# Patient Record
Sex: FEMALE | Race: WHITE | Employment: OTHER | URBAN - METROPOLITAN AREA
[De-identification: names, ages, dates, MRNs, and addresses within clinical notes are randomized per-mention and may not be internally consistent; named-entity substitution may affect disease eponyms.]

---

## 2024-02-22 NOTE — DISCHARGE INSTRUCTIONS
You may need to have a catheter for a short time.  You may need a catheter after you are sick or have had surgery.  Sometimes a catheter is used for a long time.     What Will the Results Be:  Your urine will drain and you will prevent infection.            What Care is Needed at Home?   Ask your doctor what you need to do when you go home.  Make sure you ask questions if you do not understand what the doctor says.  This way you will know what you need to do.    Your doctor may order a home health nurse to come to your house to help you learn to care for your catheter.    Prevent infections:   Wash your hands before and after handling your catheter.   If you switch between a leg bag and an overnight drainage bag, be sure you clean the connection between the catheter and the bag before you switch bags.  Ask your doctor what to use to clean the connection.   Place a cap on the drainage bag you are not using and store in a clean towel.   Rinse the empty drainage bag not in use with 1 cup vinegar mixed with 1 cup water.    Care for the tube:   Wash the skin around the catheter with soap and water each day.  Pat the skin dry.   Do not put anything on the tube.   Keep the tube secure.  Do not let the tube pull or catch when you are moving around during the day.   Do not let the tube kink or loop.    Care for the Drainage Bag:   Keep your urine bag below your bladder.   Drain the bag often to help keep you from getting an infection.   Wear cotton underwear.    What Follow-Up Care Is Needed?  Your doctor may ask you to make visits to the office to check on your progress.  Be sure to keep these visits.                                      9      What Lifestyle Changes are Needed?   Drink 6-8 glasses of water every day.   Take showers rather than soaking in a bath.                          Will Physical Activity Be Limited?   Talk to your doctor about what you can and cannot do when the catheter is in place.      What  amount catheterized at the 24 hour interval is less than 150 ml on 1 occasion, self-catheterization may be stopped.      SPECIAL INSTRUCTIONS      Call Dr. Genao's office at [phone] on a weekly basis to report to Ladan on your progress.    Call Dr. Genao with the following problems:   Symptoms of a urinary infection (pain, burning, urgency, frequency, or blood).      Fever greater than 100.4 F on 2 occasions 4 hours apart.    The inability to pass urine through the catheter.                 11         Suprapubic Catheter Instructions       After your operation, you may experience swelling around the bladder and urethra.  Swelling may limit your ability to pass urine naturally through your urethra.  The suprapubic catheter (SPC) placed into your pubic hair area allows you to pass urine until swelling subsides. The SPC prevents discomfort from bladder over distention.  The SPC site may be cleaned with soap and water.  The SPC tucks discreetly into your underpants.      By following the simple routine below, you will be able to use the SPC to empty your bladder until your natural ability to pass urine gradually returns.  You will find that as your ability to pass urine naturally improves, the need to use your SPC will be less.  On average, a woman should expect to have the SPC from 2 to 4 weeks.      Attempt to pass urine naturally as often as you like.      At first, do not be surprised if you cannot pass urine in very small amounts.        Try to void naturally immediately before each unplugging of the SPC.   3. During the day, unplug your SPC every 3 - 4 hours to ensure bladder emptying.      So you can sleep well at night, connect your SPC to the night bag.      Measure the residual amount drained out of the SPC at the end of each interval.       Use the measuring hat. If your SPC amount is 400 cc or more, unplug more often (every 2 -3 hours).      Start out with an unplugging interval of every 3 - 4 hours if

## 2024-02-22 NOTE — H&P
[penicillins] Hives 04/20/2015       MEDICATIONS:  Current Facility-Administered Medications   Medication Dose Route Frequency Provider Last Rate Last Admin    lidocaine PF 1 % injection 1 mL  1 mL IntraDERmal Once PRN Saji Rdz MD        lactated ringers IV soln infusion   IntraVENous Continuous Saji Rdz MD        sodium chloride flush 0.9 % injection 5-40 mL  5-40 mL IntraVENous 2 times per day Saji Rdz MD        sodium chloride flush 0.9 % injection 5-40 mL  5-40 mL IntraVENous PRN Saji Rdz MD        0.9 % sodium chloride infusion   IntraVENous PRN Saji Rdz MD        phenazopyridine (PYRIDIUM) tablet 200 mg  200 mg Oral Once Tish Woodward DO        gentamicin (GARAMYCIN) 95.2 mg in sodium chloride 0.9 % 100 mL IVPB  1.5 mg/kg IntraVENous Once Tish Woodward DO           FAMILY HISTORY:  family history is not on file.    SOCIAL HISTORY:   reports that she has never smoked. She has never used smokeless tobacco. She reports current alcohol use of about 6.0 standard drinks of alcohol per week. She reports that she does not use drugs.    VITALS:  Vitals:    02/26/24 1350 03/01/24 1138   BP:  139/84   Pulse:  62   Resp:  15   Temp:  96.8 °F (36 °C)   TempSrc:  Infrared   SpO2:  97%   Weight: 63.5 kg (140 lb) 62.6 kg (138 lb)   Height: 1.727 m (5' 8\") 1.727 m (5' 8\")                                                                                                                               PHYSICAL EXAM:     Unchanged from Prior H&P  General appearance: Well-developed, well-nourished, in no acute distress. Pleasant, alert and oriented to person place and time  Head/face: Normocephalic atraumatic, normal facial strength, no sinus tenderness, and no scars  Eyes: Extraocular movement intact and pupils equal round and reactive to light and accommodation  Neck/thyroid: Neck supple, thyroid nontender without masses, trachea midline  Lymph nodes: No axillary, anterior and  surgical, conservative as well as definitive. She has elected to proceed with the procedure as stated above.     The patient was counseled on the procedure. Risks and complications were reviewed in detail. The patients orders, labs, consents have been completed. The history and physical as well as all supporting surgical documentation will be forwarded to the pre-operative holding area.     The patient is aware that this procedure may not alleviate her symptoms. That there may be a necessity for a second surgery and that there may be an incomplete removal of abnormal tissue.    Tish Woodward DO  UroGyn Resident   [facility]  3/1/2024, 11:12 AM

## 2024-02-26 RX ORDER — ESTRADIOL 0.1 MG/G
2 CREAM VAGINAL
COMMUNITY

## 2024-02-26 RX ORDER — ESOMEPRAZOLE MAGNESIUM 20 MG/1
20 GRANULE, DELAYED RELEASE ORAL DAILY
COMMUNITY

## 2024-02-26 NOTE — PROGRESS NOTES
DAY OF SURGERY/PROCEDURE  GUIDELINES    As a patient at the [facility], you can expect quality medical and nursing care that is centered on your individual needs. It is our goal to make your surgical experience as comfortable and excellent as possible.  ________________________________________________________________________    The following instructions are general guidelines, if any information on this sheet is different from what your doctor has instructed you to do, please follow your doctor's instructions.    Please arrive on 3/1 @ 11:00 am      Enter through entrance C. Check in at registration     Upon arrival you will be taken to the pre-operative area to get ready for surgery, your family will stay in the waiting room and visit with you once you are ready for surgery. Due to special limitations please limit visitation to 1-2 members of your family at a time. When it is time for surgery your family will return to the waiting room.    Nothing to eat, drink, smoke, suck or chew after midnight (no water, gum, mints, cigarettes, cigars, pipes, snuff, chewing tobacco, etc.) or your surgery may be canceled.     Take a shower or bath on the morning of your surgery/procedure (Hibiclens if directed) Do not apply any lotions.    Brush your teeth, but do not swallow any water    IN CASE OF ILLNESS - If you have a cold or flu symptoms (high fever, runny nose, sore throat, cough, etc.) rash, nausea, vomiting, loose stools, and/or recent contact with someone who has a contagious disease (chick pox, measles, etc.) please call your doctor before coming to the surgery center    Take a small sip of water with heart, blood pressure, and/or seizure medication the morning of surgery. Metoprolol, levothyroxine    DO NOT take anticoagulants (blood thinners, aspirin or aspirin-containing products) as instructed by your physician.    Leave all jewelry at home and wear loose, comfortable clothing that is easy to

## 2024-02-29 ENCOUNTER — ANESTHESIA EVENT (OUTPATIENT)
Dept: OPERATING ROOM | Age: 84
End: 2024-02-29
Payer: MEDICARE

## 2024-03-01 ENCOUNTER — ANESTHESIA (OUTPATIENT)
Dept: OPERATING ROOM | Age: 84
End: 2024-03-01
Payer: MEDICARE

## 2024-03-01 ENCOUNTER — HOSPITAL ENCOUNTER (INPATIENT)
Age: 84
LOS: 1 days | Discharge: HOME | DRG: 331 | End: 2024-03-02
Attending: OBSTETRICS & GYNECOLOGY | Admitting: OBSTETRICS & GYNECOLOGY
Payer: MEDICARE

## 2024-03-01 DIAGNOSIS — K64.9 HEMORRHOIDS, UNSPECIFIED HEMORRHOID TYPE: ICD-10-CM

## 2024-03-01 DIAGNOSIS — G89.18 POST-OP PAIN: Primary | ICD-10-CM

## 2024-03-01 DIAGNOSIS — N81.10 VAGINAL PROLAPSE: ICD-10-CM

## 2024-03-01 DIAGNOSIS — K62.3 RECTAL PROLAPSE: ICD-10-CM

## 2024-03-01 PROBLEM — Z98.890 POST-OPERATIVE STATE: Status: ACTIVE | Noted: 2024-03-01

## 2024-03-01 LAB
ALBUMIN SERPL-MCNC: 4 G/DL (ref 3.5–5.2)
ANION GAP SERPL CALCULATED.3IONS-SCNC: 9 MMOL/L (ref 9–17)
BUN SERPL-MCNC: 17 MG/DL (ref 8–23)
CALCIUM SERPL-MCNC: 9.2 MG/DL (ref 8.6–10.4)
CHLORIDE SERPL-SCNC: 104 MMOL/L (ref 98–107)
CO2 SERPL-SCNC: 27 MMOL/L (ref 20–31)
CREAT SERPL-MCNC: 0.9 MG/DL (ref 0.5–0.9)
GFR SERPL CREATININE-BSD FRML MDRD: >60 ML/MIN/1.73M2
GLUCOSE SERPL-MCNC: 142 MG/DL (ref 70–99)
PHOSPHATE SERPL-MCNC: 4.1 MG/DL (ref 2.6–4.5)
POTASSIUM SERPL-SCNC: 3.8 MMOL/L (ref 3.7–5.3)
SODIUM SERPL-SCNC: 140 MMOL/L (ref 135–144)

## 2024-03-01 PROCEDURE — 6360000002 HC RX W HCPCS

## 2024-03-01 PROCEDURE — 6360000002 HC RX W HCPCS: Performed by: STUDENT IN AN ORGANIZED HEALTH CARE EDUCATION/TRAINING PROGRAM

## 2024-03-01 PROCEDURE — 2580000003 HC RX 258: Performed by: ANESTHESIOLOGY

## 2024-03-01 PROCEDURE — 6370000000 HC RX 637 (ALT 250 FOR IP)

## 2024-03-01 PROCEDURE — 0DSP4ZZ REPOSITION RECTUM, PERCUTANEOUS ENDOSCOPIC APPROACH: ICD-10-PCS | Performed by: SURGERY

## 2024-03-01 PROCEDURE — C9290 INJ, BUPIVACAINE LIPOSOME: HCPCS | Performed by: SURGERY

## 2024-03-01 PROCEDURE — 80069 RENAL FUNCTION PANEL: CPT

## 2024-03-01 PROCEDURE — 0USG4ZZ REPOSITION VAGINA, PERCUTANEOUS ENDOSCOPIC APPROACH: ICD-10-PCS | Performed by: SURGERY

## 2024-03-01 PROCEDURE — 88304 TISSUE EXAM BY PATHOLOGIST: CPT

## 2024-03-01 PROCEDURE — 06BY0ZC EXCISION OF HEMORRHOIDAL PLEXUS, OPEN APPROACH: ICD-10-PCS | Performed by: SURGERY

## 2024-03-01 PROCEDURE — 6370000000 HC RX 637 (ALT 250 FOR IP): Performed by: SURGERY

## 2024-03-01 PROCEDURE — 6360000002 HC RX W HCPCS: Performed by: SURGERY

## 2024-03-01 PROCEDURE — 6360000002 HC RX W HCPCS: Performed by: REGISTERED NURSE

## 2024-03-01 PROCEDURE — 3600000009 HC SURGERY ROBOT BASE: Performed by: OBSTETRICS & GYNECOLOGY

## 2024-03-01 PROCEDURE — 2580000003 HC RX 258: Performed by: SURGERY

## 2024-03-01 PROCEDURE — 87086 URINE CULTURE/COLONY COUNT: CPT

## 2024-03-01 PROCEDURE — 0TJB8ZZ INSPECTION OF BLADDER, VIA NATURAL OR ARTIFICIAL OPENING ENDOSCOPIC: ICD-10-PCS | Performed by: OBSTETRICS & GYNECOLOGY

## 2024-03-01 PROCEDURE — 2709999900 HC NON-CHARGEABLE SUPPLY: Performed by: OBSTETRICS & GYNECOLOGY

## 2024-03-01 PROCEDURE — 3700000000 HC ANESTHESIA ATTENDED CARE: Performed by: OBSTETRICS & GYNECOLOGY

## 2024-03-01 PROCEDURE — 6370000000 HC RX 637 (ALT 250 FOR IP): Performed by: STUDENT IN AN ORGANIZED HEALTH CARE EDUCATION/TRAINING PROGRAM

## 2024-03-01 PROCEDURE — 7100000000 HC PACU RECOVERY - FIRST 15 MIN: Performed by: OBSTETRICS & GYNECOLOGY

## 2024-03-01 PROCEDURE — 8E0W4CZ ROBOTIC ASSISTED PROCEDURE OF TRUNK REGION, PERCUTANEOUS ENDOSCOPIC APPROACH: ICD-10-PCS | Performed by: OBSTETRICS & GYNECOLOGY

## 2024-03-01 PROCEDURE — 2580000003 HC RX 258: Performed by: STUDENT IN AN ORGANIZED HEALTH CARE EDUCATION/TRAINING PROGRAM

## 2024-03-01 PROCEDURE — 36415 COLL VENOUS BLD VENIPUNCTURE: CPT

## 2024-03-01 PROCEDURE — C1758 CATHETER, URETERAL: HCPCS | Performed by: OBSTETRICS & GYNECOLOGY

## 2024-03-01 PROCEDURE — 3700000001 HC ADD 15 MINUTES (ANESTHESIA): Performed by: OBSTETRICS & GYNECOLOGY

## 2024-03-01 PROCEDURE — 2500000003 HC RX 250 WO HCPCS: Performed by: REGISTERED NURSE

## 2024-03-01 PROCEDURE — 7100000001 HC PACU RECOVERY - ADDTL 15 MIN: Performed by: OBSTETRICS & GYNECOLOGY

## 2024-03-01 PROCEDURE — 3600000019 HC SURGERY ROBOT ADDTL 15MIN: Performed by: OBSTETRICS & GYNECOLOGY

## 2024-03-01 PROCEDURE — S2900 ROBOTIC SURGICAL SYSTEM: HCPCS | Performed by: OBSTETRICS & GYNECOLOGY

## 2024-03-01 PROCEDURE — 0UWH4JZ REVISION OF SYNTHETIC SUBSTITUTE IN VAGINA AND CUL-DE-SAC, PERCUTANEOUS ENDOSCOPIC APPROACH: ICD-10-PCS | Performed by: OBSTETRICS & GYNECOLOGY

## 2024-03-01 PROCEDURE — 1200000000 HC SEMI PRIVATE

## 2024-03-01 PROCEDURE — 2720000010 HC SURG SUPPLY STERILE: Performed by: OBSTETRICS & GYNECOLOGY

## 2024-03-01 RX ORDER — CLINDAMYCIN PHOSPHATE 300 MG/50ML
300 INJECTION, SOLUTION INTRAVENOUS EVERY 8 HOURS
Status: DISCONTINUED | OUTPATIENT
Start: 2024-03-01 | End: 2024-03-01

## 2024-03-01 RX ORDER — SODIUM CHLORIDE 0.9 % (FLUSH) 0.9 %
5-40 SYRINGE (ML) INJECTION EVERY 12 HOURS SCHEDULED
Status: DISCONTINUED | OUTPATIENT
Start: 2024-03-01 | End: 2024-03-01

## 2024-03-01 RX ORDER — ULTRASOUND COUPLING MEDIUM
GEL (GRAM) TOPICAL PRN
Status: DISCONTINUED | OUTPATIENT
Start: 2024-03-01 | End: 2024-03-01 | Stop reason: ALTCHOICE

## 2024-03-01 RX ORDER — LIDOCAINE HYDROCHLORIDE 10 MG/ML
1 INJECTION, SOLUTION EPIDURAL; INFILTRATION; INTRACAUDAL; PERINEURAL
Status: DISCONTINUED | OUTPATIENT
Start: 2024-03-01 | End: 2024-03-01

## 2024-03-01 RX ORDER — CLINDAMYCIN PHOSPHATE 900 MG/50ML
300 INJECTION, SOLUTION INTRAVENOUS EVERY 8 HOURS
Status: DISCONTINUED | OUTPATIENT
Start: 2024-03-01 | End: 2024-03-02

## 2024-03-01 RX ORDER — SODIUM CHLORIDE 9 MG/ML
INJECTION, SOLUTION INTRAVENOUS PRN
Status: DISCONTINUED | OUTPATIENT
Start: 2024-03-01 | End: 2024-03-02 | Stop reason: HOSPADM

## 2024-03-01 RX ORDER — HYDRALAZINE HYDROCHLORIDE 20 MG/ML
10 INJECTION INTRAMUSCULAR; INTRAVENOUS
Status: DISCONTINUED | OUTPATIENT
Start: 2024-03-01 | End: 2024-03-01

## 2024-03-01 RX ORDER — ONDANSETRON 2 MG/ML
INJECTION INTRAMUSCULAR; INTRAVENOUS PRN
Status: DISCONTINUED | OUTPATIENT
Start: 2024-03-01 | End: 2024-03-01

## 2024-03-01 RX ORDER — OXYCODONE HYDROCHLORIDE 5 MG/1
5 TABLET ORAL EVERY 4 HOURS PRN
Status: DISCONTINUED | OUTPATIENT
Start: 2024-03-01 | End: 2024-03-02 | Stop reason: HOSPADM

## 2024-03-01 RX ORDER — SODIUM CHLORIDE, SODIUM LACTATE, POTASSIUM CHLORIDE, CALCIUM CHLORIDE 600; 310; 30; 20 MG/100ML; MG/100ML; MG/100ML; MG/100ML
INJECTION, SOLUTION INTRAVENOUS CONTINUOUS
Status: DISCONTINUED | OUTPATIENT
Start: 2024-03-01 | End: 2024-03-02

## 2024-03-01 RX ORDER — SODIUM CHLORIDE 0.9 % (FLUSH) 0.9 %
5-40 SYRINGE (ML) INJECTION EVERY 12 HOURS SCHEDULED
Status: DISCONTINUED | OUTPATIENT
Start: 2024-03-01 | End: 2024-03-02 | Stop reason: HOSPADM

## 2024-03-01 RX ORDER — FENTANYL CITRATE 50 UG/ML
INJECTION, SOLUTION INTRAMUSCULAR; INTRAVENOUS PRN
Status: DISCONTINUED | OUTPATIENT
Start: 2024-03-01 | End: 2024-03-01 | Stop reason: SDUPTHER

## 2024-03-01 RX ORDER — CIPROFLOXACIN 250 MG/1
250 TABLET, FILM COATED ORAL 2 TIMES DAILY
Qty: 14 TABLET | Refills: 0 | Status: SHIPPED | OUTPATIENT
Start: 2024-03-01 | End: 2024-03-08

## 2024-03-01 RX ORDER — LIDOCAINE 40 MG/G
CREAM TOPICAL PRN
Status: DISCONTINUED | OUTPATIENT
Start: 2024-03-01 | End: 2024-03-02 | Stop reason: HOSPADM

## 2024-03-01 RX ORDER — SODIUM CHLORIDE 0.9 % (FLUSH) 0.9 %
5-40 SYRINGE (ML) INJECTION PRN
Status: DISCONTINUED | OUTPATIENT
Start: 2024-03-01 | End: 2024-03-01

## 2024-03-01 RX ORDER — ESCITALOPRAM OXALATE 10 MG/1
5 TABLET ORAL DAILY
Status: DISCONTINUED | OUTPATIENT
Start: 2024-03-02 | End: 2024-03-02 | Stop reason: HOSPADM

## 2024-03-01 RX ORDER — SODIUM CHLORIDE 9 MG/ML
INJECTION, SOLUTION INTRAMUSCULAR; INTRAVENOUS; SUBCUTANEOUS
Status: DISPENSED
Start: 2024-03-01 | End: 2024-03-02

## 2024-03-01 RX ORDER — ONDANSETRON 2 MG/ML
INJECTION INTRAMUSCULAR; INTRAVENOUS PRN
Status: DISCONTINUED | OUTPATIENT
Start: 2024-03-01 | End: 2024-03-01 | Stop reason: SDUPTHER

## 2024-03-01 RX ORDER — PHENAZOPYRIDINE HYDROCHLORIDE 100 MG/1
TABLET, FILM COATED ORAL
Status: COMPLETED
Start: 2024-03-01 | End: 2024-03-01

## 2024-03-01 RX ORDER — CLINDAMYCIN PHOSPHATE 600 MG/50ML
600 INJECTION, SOLUTION INTRAVENOUS ONCE
Status: DISCONTINUED | OUTPATIENT
Start: 2024-03-01 | End: 2024-03-01 | Stop reason: SDUPTHER

## 2024-03-01 RX ORDER — MEPERIDINE HYDROCHLORIDE 50 MG/ML
12.5 INJECTION INTRAMUSCULAR; INTRAVENOUS; SUBCUTANEOUS EVERY 5 MIN PRN
Status: DISCONTINUED | OUTPATIENT
Start: 2024-03-01 | End: 2024-03-01

## 2024-03-01 RX ORDER — ESOMEPRAZOLE MAGNESIUM 20 MG/1
20 GRANULE, DELAYED RELEASE ORAL DAILY
Status: DISCONTINUED | OUTPATIENT
Start: 2024-03-01 | End: 2024-03-01 | Stop reason: CLARIF

## 2024-03-01 RX ORDER — GABAPENTIN 300 MG/1
300 CAPSULE ORAL 3 TIMES DAILY PRN
Status: DISCONTINUED | OUTPATIENT
Start: 2024-03-01 | End: 2024-03-02 | Stop reason: HOSPADM

## 2024-03-01 RX ORDER — MORPHINE SULFATE 2 MG/ML
INJECTION, SOLUTION INTRAMUSCULAR; INTRAVENOUS
Status: COMPLETED
Start: 2024-03-01 | End: 2024-03-01

## 2024-03-01 RX ORDER — LIDOCAINE HYDROCHLORIDE 10 MG/ML
INJECTION, SOLUTION INFILTRATION; PERINEURAL PRN
Status: DISCONTINUED | OUTPATIENT
Start: 2024-03-01 | End: 2024-03-01 | Stop reason: SDUPTHER

## 2024-03-01 RX ORDER — DEXAMETHASONE SODIUM PHOSPHATE 10 MG/ML
INJECTION, SOLUTION INTRAMUSCULAR; INTRAVENOUS PRN
Status: DISCONTINUED | OUTPATIENT
Start: 2024-03-01 | End: 2024-03-01 | Stop reason: SDUPTHER

## 2024-03-01 RX ORDER — OXYCODONE HYDROCHLORIDE 5 MG/1
10 TABLET ORAL EVERY 4 HOURS PRN
Status: DISCONTINUED | OUTPATIENT
Start: 2024-03-01 | End: 2024-03-02 | Stop reason: HOSPADM

## 2024-03-01 RX ORDER — IPRATROPIUM BROMIDE AND ALBUTEROL SULFATE 2.5; .5 MG/3ML; MG/3ML
1 SOLUTION RESPIRATORY (INHALATION)
Status: DISCONTINUED | OUTPATIENT
Start: 2024-03-01 | End: 2024-03-01

## 2024-03-01 RX ORDER — ONDANSETRON 2 MG/ML
4 INJECTION INTRAMUSCULAR; INTRAVENOUS
Status: DISCONTINUED | OUTPATIENT
Start: 2024-03-01 | End: 2024-03-01

## 2024-03-01 RX ORDER — M-VIT,TX,IRON,MINS/CALC/FOLIC 27MG-0.4MG
1 TABLET ORAL DAILY
Status: DISCONTINUED | OUTPATIENT
Start: 2024-03-02 | End: 2024-03-02 | Stop reason: HOSPADM

## 2024-03-01 RX ORDER — ENOXAPARIN SODIUM 100 MG/ML
40 INJECTION SUBCUTANEOUS DAILY
Qty: 4.2 ML | Refills: 0 | Status: SHIPPED | OUTPATIENT
Start: 2024-03-01 | End: 2024-03-08

## 2024-03-01 RX ORDER — ACETAMINOPHEN 500 MG
1000 TABLET ORAL 3 TIMES DAILY
Qty: 540 TABLET | Refills: 1 | Status: SHIPPED | OUTPATIENT
Start: 2024-03-01

## 2024-03-01 RX ORDER — SODIUM CHLORIDE, SODIUM LACTATE, POTASSIUM CHLORIDE, CALCIUM CHLORIDE 600; 310; 30; 20 MG/100ML; MG/100ML; MG/100ML; MG/100ML
INJECTION, SOLUTION INTRAVENOUS CONTINUOUS
Status: DISCONTINUED | OUTPATIENT
Start: 2024-03-01 | End: 2024-03-01

## 2024-03-01 RX ORDER — GLYCOPYRROLATE 0.2 MG/ML
INJECTION INTRAMUSCULAR; INTRAVENOUS PRN
Status: DISCONTINUED | OUTPATIENT
Start: 2024-03-01 | End: 2024-03-01 | Stop reason: SDUPTHER

## 2024-03-01 RX ORDER — CYCLOBENZAPRINE HCL 5 MG
TABLET ORAL
Status: COMPLETED
Start: 2024-03-01 | End: 2024-03-01

## 2024-03-01 RX ORDER — LABETALOL HYDROCHLORIDE 5 MG/ML
10 INJECTION, SOLUTION INTRAVENOUS
Status: DISCONTINUED | OUTPATIENT
Start: 2024-03-01 | End: 2024-03-01

## 2024-03-01 RX ORDER — ACETAMINOPHEN 500 MG
1000 TABLET ORAL EVERY 8 HOURS SCHEDULED
Status: DISCONTINUED | OUTPATIENT
Start: 2024-03-01 | End: 2024-03-02 | Stop reason: HOSPADM

## 2024-03-01 RX ORDER — PHENYLEPHRINE HCL IN 0.9% NACL 1 MG/10 ML
SYRINGE (ML) INTRAVENOUS PRN
Status: DISCONTINUED | OUTPATIENT
Start: 2024-03-01 | End: 2024-03-01 | Stop reason: SDUPTHER

## 2024-03-01 RX ORDER — ROCURONIUM BROMIDE 10 MG/ML
INJECTION, SOLUTION INTRAVENOUS PRN
Status: DISCONTINUED | OUTPATIENT
Start: 2024-03-01 | End: 2024-03-01 | Stop reason: SDUPTHER

## 2024-03-01 RX ORDER — SODIUM CHLORIDE 0.9 % (FLUSH) 0.9 %
5-40 SYRINGE (ML) INJECTION PRN
Status: DISCONTINUED | OUTPATIENT
Start: 2024-03-01 | End: 2024-03-02 | Stop reason: HOSPADM

## 2024-03-01 RX ORDER — CYCLOBENZAPRINE HCL 5 MG
10 TABLET ORAL 3 TIMES DAILY PRN
Status: DISCONTINUED | OUTPATIENT
Start: 2024-03-01 | End: 2024-03-02 | Stop reason: HOSPADM

## 2024-03-01 RX ORDER — ENOXAPARIN SODIUM 100 MG/ML
40 INJECTION SUBCUTANEOUS DAILY
Status: DISCONTINUED | OUTPATIENT
Start: 2024-03-02 | End: 2024-03-02 | Stop reason: HOSPADM

## 2024-03-01 RX ORDER — PANTOPRAZOLE SODIUM 40 MG/1
40 TABLET, DELAYED RELEASE ORAL
Status: DISCONTINUED | OUTPATIENT
Start: 2024-03-01 | End: 2024-03-02 | Stop reason: HOSPADM

## 2024-03-01 RX ORDER — OXYCODONE HYDROCHLORIDE 5 MG/1
5 TABLET ORAL EVERY 6 HOURS PRN
Qty: 12 TABLET | Refills: 0 | Status: SHIPPED | OUTPATIENT
Start: 2024-03-01 | End: 2024-03-04

## 2024-03-01 RX ORDER — DIPHENHYDRAMINE HYDROCHLORIDE 50 MG/ML
12.5 INJECTION INTRAMUSCULAR; INTRAVENOUS
Status: DISCONTINUED | OUTPATIENT
Start: 2024-03-01 | End: 2024-03-01

## 2024-03-01 RX ORDER — SENNOSIDES 8.6 MG
1 TABLET ORAL DAILY
Qty: 120 TABLET | Refills: 0 | Status: SHIPPED | OUTPATIENT
Start: 2024-03-01

## 2024-03-01 RX ORDER — CLINDAMYCIN PHOSPHATE 600 MG/50ML
600 INJECTION, SOLUTION INTRAVENOUS
Status: COMPLETED | OUTPATIENT
Start: 2024-03-01 | End: 2024-03-01

## 2024-03-01 RX ORDER — MIDAZOLAM HYDROCHLORIDE 2 MG/2ML
2 INJECTION, SOLUTION INTRAMUSCULAR; INTRAVENOUS
Status: DISCONTINUED | OUTPATIENT
Start: 2024-03-01 | End: 2024-03-01

## 2024-03-01 RX ORDER — SODIUM CHLORIDE 9 MG/ML
INJECTION, SOLUTION INTRAVENOUS PRN
Status: DISCONTINUED | OUTPATIENT
Start: 2024-03-01 | End: 2024-03-01

## 2024-03-01 RX ORDER — MORPHINE SULFATE 2 MG/ML
2 INJECTION, SOLUTION INTRAMUSCULAR; INTRAVENOUS EVERY 5 MIN PRN
Status: DISCONTINUED | OUTPATIENT
Start: 2024-03-01 | End: 2024-03-01

## 2024-03-01 RX ORDER — LEVOTHYROXINE SODIUM 88 UG/1
88 TABLET ORAL DAILY
Status: DISCONTINUED | OUTPATIENT
Start: 2024-03-02 | End: 2024-03-02 | Stop reason: HOSPADM

## 2024-03-01 RX ORDER — PHENAZOPYRIDINE HYDROCHLORIDE 100 MG/1
200 TABLET, FILM COATED ORAL ONCE
Status: COMPLETED | OUTPATIENT
Start: 2024-03-01 | End: 2024-03-01

## 2024-03-01 RX ORDER — CLINDAMYCIN PHOSPHATE 600 MG/50ML
600 INJECTION, SOLUTION INTRAVENOUS
Status: DISCONTINUED | OUTPATIENT
Start: 2024-03-01 | End: 2024-03-01

## 2024-03-01 RX ORDER — SIMETHICONE 80 MG
80 TABLET,CHEWABLE ORAL 4 TIMES DAILY PRN
Qty: 180 TABLET | Refills: 3 | Status: SHIPPED | OUTPATIENT
Start: 2024-03-01

## 2024-03-01 RX ORDER — PROPOFOL 10 MG/ML
INJECTION, EMULSION INTRAVENOUS PRN
Status: DISCONTINUED | OUTPATIENT
Start: 2024-03-01 | End: 2024-03-01 | Stop reason: SDUPTHER

## 2024-03-01 RX ORDER — CYCLOBENZAPRINE HCL 5 MG
TABLET ORAL
Status: DISPENSED
Start: 2024-03-01 | End: 2024-03-02

## 2024-03-01 RX ORDER — METOCLOPRAMIDE HYDROCHLORIDE 5 MG/ML
10 INJECTION INTRAMUSCULAR; INTRAVENOUS
Status: DISCONTINUED | OUTPATIENT
Start: 2024-03-01 | End: 2024-03-01

## 2024-03-01 RX ORDER — FENOFIBRATE 54 MG/1
160 TABLET ORAL DAILY
Status: DISCONTINUED | OUTPATIENT
Start: 2024-03-01 | End: 2024-03-02 | Stop reason: HOSPADM

## 2024-03-01 RX ORDER — ONDANSETRON 2 MG/ML
4 INJECTION INTRAMUSCULAR; INTRAVENOUS EVERY 6 HOURS PRN
Status: DISCONTINUED | OUTPATIENT
Start: 2024-03-01 | End: 2024-03-02 | Stop reason: HOSPADM

## 2024-03-01 RX ADMIN — SODIUM CHLORIDE, POTASSIUM CHLORIDE, SODIUM LACTATE AND CALCIUM CHLORIDE: 600; 310; 30; 20 INJECTION, SOLUTION INTRAVENOUS at 11:53

## 2024-03-01 RX ADMIN — Medication 0.5 MG: at 15:45

## 2024-03-01 RX ADMIN — Medication 100 MCG: at 13:33

## 2024-03-01 RX ADMIN — METOPROLOL TARTRATE 25 MG: 25 TABLET, FILM COATED ORAL at 21:17

## 2024-03-01 RX ADMIN — MORPHINE SULFATE 2 MG: 2 INJECTION, SOLUTION INTRAMUSCULAR; INTRAVENOUS at 16:10

## 2024-03-01 RX ADMIN — Medication 100 MCG: at 13:24

## 2024-03-01 RX ADMIN — PROPOFOL 20 MG: 10 INJECTION, EMULSION INTRAVENOUS at 15:16

## 2024-03-01 RX ADMIN — PROPOFOL 150 MG: 10 INJECTION, EMULSION INTRAVENOUS at 12:57

## 2024-03-01 RX ADMIN — OXYCODONE HYDROCHLORIDE 5 MG: 5 TABLET ORAL at 21:17

## 2024-03-01 RX ADMIN — GENTAMICIN SULFATE 94 MG: 40 INJECTION, SOLUTION INTRAMUSCULAR; INTRAVENOUS at 22:36

## 2024-03-01 RX ADMIN — Medication 100 MCG: at 13:26

## 2024-03-01 RX ADMIN — ROCURONIUM BROMIDE 10 MG: 10 SOLUTION INTRAVENOUS at 13:30

## 2024-03-01 RX ADMIN — GENTAMICIN SULFATE 100 MG: 40 INJECTION, SOLUTION INTRAMUSCULAR; INTRAVENOUS at 12:01

## 2024-03-01 RX ADMIN — ONDANSETRON 4 MG: 2 INJECTION INTRAMUSCULAR; INTRAVENOUS at 12:50

## 2024-03-01 RX ADMIN — FENTANYL CITRATE 25 MCG: 50 INJECTION, SOLUTION INTRAMUSCULAR; INTRAVENOUS at 15:02

## 2024-03-01 RX ADMIN — DEXAMETHASONE SODIUM PHOSPHATE 10 MG: 10 INJECTION INTRAMUSCULAR; INTRAVENOUS at 13:09

## 2024-03-01 RX ADMIN — FENOFIBRATE 162 MG: 54 TABLET, FILM COATED ORAL at 21:17

## 2024-03-01 RX ADMIN — FENTANYL CITRATE 50 MCG: 50 INJECTION, SOLUTION INTRAMUSCULAR; INTRAVENOUS at 12:57

## 2024-03-01 RX ADMIN — SUGAMMADEX 50 MG: 100 INJECTION, SOLUTION INTRAVENOUS at 14:54

## 2024-03-01 RX ADMIN — ROCURONIUM BROMIDE 40 MG: 10 SOLUTION INTRAVENOUS at 12:57

## 2024-03-01 RX ADMIN — HYDROMORPHONE HYDROCHLORIDE 0.5 MG: 1 INJECTION, SOLUTION INTRAMUSCULAR; INTRAVENOUS; SUBCUTANEOUS at 15:55

## 2024-03-01 RX ADMIN — CYCLOBENZAPRINE HYDROCHLORIDE 10 MG: 5 TABLET, FILM COATED ORAL at 15:51

## 2024-03-01 RX ADMIN — ACETAMINOPHEN 1000 MG: 500 TABLET ORAL at 21:17

## 2024-03-01 RX ADMIN — FENTANYL CITRATE 25 MCG: 50 INJECTION, SOLUTION INTRAMUSCULAR; INTRAVENOUS at 15:21

## 2024-03-01 RX ADMIN — ROCURONIUM BROMIDE 10 MG: 10 SOLUTION INTRAVENOUS at 14:19

## 2024-03-01 RX ADMIN — PHENAZOPYRIDINE HYDROCHLORIDE 200 MG: 100 TABLET, FILM COATED ORAL at 11:56

## 2024-03-01 RX ADMIN — SUGAMMADEX 50 MG: 100 INJECTION, SOLUTION INTRAVENOUS at 15:09

## 2024-03-01 RX ADMIN — PHENAZOPYRIDINE HYDROCHLORIDE 200 MG: 100 TABLET ORAL at 11:56

## 2024-03-01 RX ADMIN — FENTANYL CITRATE 50 MCG: 50 INJECTION, SOLUTION INTRAMUSCULAR; INTRAVENOUS at 15:27

## 2024-03-01 RX ADMIN — CLINDAMYCIN PHOSPHATE 600 MG: 600 INJECTION, SOLUTION INTRAVENOUS at 13:09

## 2024-03-01 RX ADMIN — LIDOCAINE HYDROCHLORIDE 40 MG: 10 INJECTION, SOLUTION INFILTRATION; PERINEURAL at 12:57

## 2024-03-01 RX ADMIN — SODIUM CHLORIDE, POTASSIUM CHLORIDE, SODIUM LACTATE AND CALCIUM CHLORIDE: 600; 310; 30; 20 INJECTION, SOLUTION INTRAVENOUS at 17:45

## 2024-03-01 RX ADMIN — Medication 10 MG: at 15:51

## 2024-03-01 RX ADMIN — Medication 0.5 MG: at 15:55

## 2024-03-01 RX ADMIN — SODIUM CHLORIDE, POTASSIUM CHLORIDE, SODIUM LACTATE AND CALCIUM CHLORIDE: 600; 310; 30; 20 INJECTION, SOLUTION INTRAVENOUS at 14:54

## 2024-03-01 RX ADMIN — HYDROMORPHONE HYDROCHLORIDE 0.5 MG: 1 INJECTION, SOLUTION INTRAMUSCULAR; INTRAVENOUS; SUBCUTANEOUS at 15:45

## 2024-03-01 RX ADMIN — CLINDAMYCIN PHOSPHATE 300 MG: 900 INJECTION, SOLUTION INTRAVENOUS at 23:42

## 2024-03-01 RX ADMIN — PROPOFOL 30 MG: 10 INJECTION, EMULSION INTRAVENOUS at 15:18

## 2024-03-01 RX ADMIN — FENTANYL CITRATE 50 MCG: 50 INJECTION, SOLUTION INTRAMUSCULAR; INTRAVENOUS at 13:40

## 2024-03-01 RX ADMIN — GLYCOPYRROLATE 0.2 MG: 0.2 INJECTION INTRAMUSCULAR; INTRAVENOUS at 13:23

## 2024-03-01 RX ADMIN — SUGAMMADEX 50 MG: 100 INJECTION, SOLUTION INTRAVENOUS at 15:00

## 2024-03-01 RX ADMIN — Medication 100 MCG: at 14:04

## 2024-03-01 ASSESSMENT — PAIN DESCRIPTION - LOCATION
LOCATION: PELVIS;ABDOMEN
LOCATION: PELVIS;ABDOMEN
LOCATION: ABDOMEN

## 2024-03-01 ASSESSMENT — PAIN DESCRIPTION - ORIENTATION
ORIENTATION: MID
ORIENTATION: LOWER

## 2024-03-01 ASSESSMENT — PAIN - FUNCTIONAL ASSESSMENT
PAIN_FUNCTIONAL_ASSESSMENT: PREVENTS OR INTERFERES SOME ACTIVE ACTIVITIES AND ADLS
PAIN_FUNCTIONAL_ASSESSMENT: NONE - DENIES PAIN

## 2024-03-01 ASSESSMENT — PAIN DESCRIPTION - DESCRIPTORS
DESCRIPTORS: PRESSURE
DESCRIPTORS: ACHING;DISCOMFORT;SPASM
DESCRIPTORS: STABBING

## 2024-03-01 ASSESSMENT — PAIN SCALES - GENERAL
PAINLEVEL_OUTOF10: 10
PAINLEVEL_OUTOF10: 8
PAINLEVEL_OUTOF10: 8
PAINLEVEL_OUTOF10: 9
PAINLEVEL_OUTOF10: 10
PAINLEVEL_OUTOF10: 6
PAINLEVEL_OUTOF10: 8
PAINLEVEL_OUTOF10: 0

## 2024-03-01 ASSESSMENT — PAIN DESCRIPTION - PAIN TYPE: TYPE: SURGICAL PAIN

## 2024-03-01 ASSESSMENT — PAIN DESCRIPTION - FREQUENCY: FREQUENCY: INTERMITTENT

## 2024-03-01 ASSESSMENT — PAIN DESCRIPTION - ONSET: ONSET: GRADUAL

## 2024-03-01 NOTE — OP NOTE
Operative Note      Patient: aMye Jerome  YOB: 1940  MRN: [id number]    Date of Procedure: 3/1/2024    Pre-Op Diagnosis Codes:     * Vaginal prolapse [N81.10]     * Rectal prolapse [K62.3]     * Hemorrhoids, unspecified hemorrhoid type [K64.9]    Post-Op Diagnosis: Same       Procedure(s):  Cystoscopy with Placement of Bilateral Lighted Ureteral Stents, Laparoscopic Robotic Assisted Sacral Colpopexy  ROBOTIC RECTOPEXY  HEMORRHOIDECTOMY WITH LIGASURE    Surgeon(s):  Coco Cano MD Croak, Andrew J, DO    Assistant:   Resident: Tish Woodward DO    Anesthesia: General    Estimated Blood Loss (mL): Minimal    Complications: None    Specimens:   ID Type Source Tests Collected by Time Destination   1 : Urine Culture Urine Urine, straight catheter CULTURE, URINE Cruzito Genao,  3/1/2024 1354    A : External Hemorrhoids Tissue Tissue SURGICAL PATHOLOGY Cruzito Genao,  3/1/2024 1456        Implants:  * No implants in log *      Drains:   [REMOVED] Urinary Catheter 03/01/24 2 Way (Removed)       Findings: several thrombosed hemorrhoids, rectal prolapse        Detailed Description of Procedure:   Patient for surgery jointly with dr. Genao and his portion will be dictated by his service.  Patient in lithotomy, asleep and prepped and draped.    Lighted ureteral stents in place per Dr. Genao.  He has performed repeat sacral colpopexy with old mesh already in place robotically.    Time out performed for myself and camera changes to 30 degree. Floppy sigmoid retracted out of pelvis and retracted towards left side.  Using scissors the right side of the sigmoid peritoneum taken down as well as developing a rectorectal preperitoneal space down past the peritoneal reflection.    Same tissue plane then developed on the left side of the sigmoid and then the peritoneum taken down off the anterior side of the colon.    The rectum retracted cephalad and 2 figure 8 sutures placed between the upper rectum and   Birgit's mesh on the right side Additional figure 8 suture of the 0 Proline then placed between the upper rectum and the sacral prominence.    Robotic instruments removed and robot undocked.. Trocar sites closed per Dr. Genao's team and later 20 ml total, Exparel mixed with saline injected at all fo the trocar sites.       Rectal retractor placed with KY jelly and 3 large thrombosed hemorrhoid seen, 2 on the left and 1 on the posterior/right side.   All 3 grasped with Babcocks and removed with small ligasure.  Mucosal openings closed with running locking 3-0 Vicryl and the rest of the local mixture injected circumferentially around the rectum.  Almanza and ureteral stents removed.    Sponge and needle count correct. Patient awoken and returned ot recovery.  Findings discussed with patient's family.    Electronically signed by Coco Cano MD on 3/1/2024 at 3:19 PM

## 2024-03-01 NOTE — PROGRESS NOTES
Pharmacy called regarding renal labs that were due at 1700.  Called lab, but they were in the ER and person on phone would relay message to come and draw.

## 2024-03-01 NOTE — DISCHARGE SUMMARY
Urogynecology Discharge Summary  [facility]      Patient Name: Maye Jerome  Patient : 1940  Primary Care Physician: Alvarez Castillo MD  Admit Date: 3/1/2024    Principal Diagnosis: Post Op State    Other Diagnosis:   Vaginal prolapse [N81.10]  Rectal prolapse [K62.3]  Hemorrhoids, unspecified hemorrhoid type [K64.9]  Post-operative state [Z98.890]  Patient Active Problem List   Diagnosis    Anxiety state    Disorder of mitral valve    Migraine headache    Prolapse of vaginal vault after hysterectomy    Proctocele    Bilateral hearing loss    Acid reflux    Irritable bowel syndrome    Mitral valve disease    Deflected nasal septum    Constipation by outlet dysfunction    Hypoactive thyroid    Post-operative state       Infection: No  Hospital Acquired: No    Surgical Operations & Procedures: Robotic Sacrocolpopexy and rectopexy with hemorrhoidectomy 3/1/24    Consultations: Anesthesia, General Surgery    Pertinent Findings & Procedures:   Maye Jerome is a 83 y.o. female admitted for surgical management of hemorroids, vaginal vault prolapse, proctocele.  She underwent Robotic Sacrocolpoplexy and rectopexy with hemorrhoidectomy 3.  POD#1 labs were wnl. Post-operatively, patient voided. She was discharged home without a joseph catheter. Post-op course normal, discharged home on POD# 1.  Follow up in 1 week. Discharge instructions reviewed and questions answered.    Course of patient: normal    Discharge to: Home    Readmission planned: No    Recommendations on Discharge:     Medications:     Medication List        START taking these medications      acetaminophen 500 MG tablet  Commonly known as: TYLENOL  Take 2 tablets by mouth 3 times daily     ciprofloxacin 250 MG tablet  Commonly known as: CIPRO  Take 1 tablet by mouth 2 times daily for 7 days Take for full 7 days if discharged home with a joseph, take for 5 days if discharged home without a joseph     enoxaparin Sodium 30  3/2/24    Comments:  Home care, Follow-up care, restrictions reviewed.    Tanna Carranza DO  Urogynecology Resident  [facility]  3/2/2024, 10:59 AM

## 2024-03-01 NOTE — PROGRESS NOTES
Gynecology Progress Note    Date: 3/2/2024  Time: 8:31 AM    Maye Jerome 83 y.o. female , POD # 1 Robotic Sacrocolpoplexy and rectopexy with hemorrhoidectomy 3/1/24    Patient seen and examined. She had no major complaints overnight overnight. Pain is controlled.  Patient is  tolerating oral intake. She is urinating well.  She denies any vaginal bleeding. She is  ambulating well  She is not passing flatus however endorses feeling \"rumbling\" and gas movement in her abdomen. She denies Fever/Chills, Chest Pain, SOB, N/V, Calf Pain.    The patient recalls from preoperative counseling and accepts that up to 26% of women may persist in these symptoms or develop de fei incontinence. If preoperative bladder testing was negative, there was no indication for an incontinence procedure. The patient understands if these symptoms persist, further treatment may be recommended.    Vitals:  Vitals:    03/01/24 2147 03/02/24 0002 03/02/24 0319 03/02/24 0747   BP:  130/68 135/67 119/65   Pulse:  69 63 57   Resp: 16 18 16 16   Temp:  99 °F (37.2 °C) 98 °F (36.7 °C) 97.7 °F (36.5 °C)   TempSrc:  Oral Oral Oral   SpO2:  93% 98% 93%   Weight:       Height:             Intake/Output:   Last Shift: I/O last 3 completed shifts:  In: 1580 [P.O.:480; I.V.:1100]  Out: 810 [Urine:810]  Current Shift: No intake/output data recorded.  700 mL out in last 12 hrs ~ 58 mL/hr      Physical Exam:  General:  no apparent distress, alert and cooperative  Neurologic:  alert, oriented, normal speech, no focal findings or movement disorder noted  Lungs:  No increased work of breathing, good air exchange, clear to auscultation bilaterally, no crackles or wheezing  Heart:  normal S1 and S2, regular rate and rhythm and no murmur, rubs, or gallops  Abdomen: soft and distended, appropriate tenderness, no CVA tenderness, good Bowel sounds. No issues with RUQ incision.  Incision: clean, dry, intact and dressed in dermabond  Extremities:  no calf tenderness, non  spirometer              - Pain control: Tylenol/Flexiril/Gabapentin/Claista for pain     - Lidocaine cream per Dr. Cano              - Labs: CBC, BMP in the AM reviewed and wnl     - Hgb noted to be 11.5              - DVT Proph: Lovenox 40mg to be given on POD#1 AM if Hgb >8              - Abx: Gent/clinda x 24h                     - Diet: Clear liquid> General diet this AM              - Continue post-op care, please page with any questions. Please page general surgery Dr. Cano with questions regarding diet and bowel management.               - Likely discharge home later today    Abdominal Pain   - Patient endorses abdominal pain over her right port sites   - Endorsed abdominal distention overnight that has now resolved   - Patient appropriately tender to palpation   - Abdomen soft, non rigid   - Continue current pain management     Atrial Fibrillation   - Patient on Eliquis at home   - Metoprolol 25 mg daily   - last dose was 2/26   - Had clearance from cardiology   - Will bridge with daily lovenox for 7 days - home Rx generated    Anxiety   - On lexapro at home and ordered here   - On Xanax at home- not ordered    Hypothyroid   - Home dose of synthroid ordered- 88 mg daily    Kim Whitehead MD  Urogynecology Resident  3/2/2024, 8:31 AM      Patient seen and examined. Doing well. Abdomen soft with mild distention. No vaginal issues. Voiding well. Home instructions reviewed and understood by patient. Home Lovenox for 1 week - hold Eliquis as is too aggressive for pelvic surgeries. MDM/POC updated with team. DC home today.

## 2024-03-01 NOTE — BRIEF OP NOTE
Brief Operative Note  Department of Obstetrics and Gynecology  [facility]     Patient: Maye Jerome   : 1940  MRN: [id number]       Acct: [id number]   Date of Procedure: 3/1/24     Pre-operative Diagnosis: 83 y.o. female  Vaginal Vault Prolapse  Hemorrhoids  Atrial Fibrillation  Proctocele      Post-operative Diagnosis:   Vaginal Vault Prolapse  Hemorrhoids  Atrial Fibrillation  Proctocele    Procedure: Robotic Sacrocolpopexy, Cystoscopy    Surgeon: Dr. Genao     Assistant(s): Tish Woodward DO, PGY4    Anesthesia: General    Findings:  Normal appearing external genitalia. Normal appearing vaginal mucosa. Vaginal vault prolpase. External Hemorrhoids  Total IV fluids/Blood products:  1000 ml crystalloid  Urine Output:  300 ml    Estimated blood loss:  100 ml  Drains:  none  Specimens:  none  Instrument and Sponge Count: Correct  Complications:  none  Condition:  After uro-gynecological portion of the case, the case was turned over to Dr. Cano and her team for their portion. Please see their  dictation for their portion of the case.    See full operative report for further details.    Tish Woodward DO  Ob/Gyn Resident  3/1/2024, 12:01 PM

## 2024-03-01 NOTE — ANESTHESIA POSTPROCEDURE EVALUATION
Department of Anesthesiology  Postprocedure Note    Patient: Maye Jerome  MRN: [id number]  YOB: 1940  Date of evaluation: 3/1/2024    Procedure Summary       Date: 03/01/24 Room / Location: [address]    Anesthesia Start: 1255 Anesthesia Stop: 1528    Procedures:       Cystoscopy with Placement of Bilateral Lighted Ureteral Stents, Laparoscopic Robotic Assisted Sacral Colpopexy      ROBOTIC RECTOPEXY      HEMORRHOIDECTOMY WITH LIGASURE Diagnosis:       Vaginal prolapse      Rectal prolapse      Hemorrhoids, unspecified hemorrhoid type      (Vaginal prolapse [N81.10])      (Rectal prolapse [K62.3])      (Hemorrhoids, unspecified hemorrhoid type [K64.9])    Surgeons: Cruzito Genao DO; Ccoo Cano MD Responsible Provider: Soumya Aguilar MD    Anesthesia Type: general ASA Status: 3            Anesthesia Type: No value filed.    Roselyn Phase I: Roselyn Score: 8    Roselyn Phase II:      Anesthesia Post Evaluation    Patient location during evaluation: PACU  Patient participation: complete - patient participated  Level of consciousness: awake and alert  Airway patency: patent  Nausea & Vomiting: no nausea and no vomiting  Cardiovascular status: hemodynamically stable  Respiratory status: room air and spontaneous ventilation  Hydration status: euvolemic  Multimodal analgesia pain management approach  Pain management: adequate    No notable events documented.

## 2024-03-02 VITALS
HEIGHT: 68 IN | BODY MASS INDEX: 20.92 KG/M2 | RESPIRATION RATE: 16 BRPM | OXYGEN SATURATION: 93 % | SYSTOLIC BLOOD PRESSURE: 119 MMHG | HEART RATE: 57 BPM | WEIGHT: 138 LBS | DIASTOLIC BLOOD PRESSURE: 65 MMHG | TEMPERATURE: 97.7 F

## 2024-03-02 LAB
ANION GAP SERPL CALCULATED.3IONS-SCNC: 9 MMOL/L (ref 9–17)
BASOPHILS # BLD: 0 K/UL (ref 0–0.2)
BASOPHILS NFR BLD: 0 % (ref 0–2)
BUN SERPL-MCNC: 16 MG/DL (ref 8–23)
CALCIUM SERPL-MCNC: 8.9 MG/DL (ref 8.6–10.4)
CHLORIDE SERPL-SCNC: 103 MMOL/L (ref 98–107)
CO2 SERPL-SCNC: 27 MMOL/L (ref 20–31)
CREAT SERPL-MCNC: 0.9 MG/DL (ref 0.5–0.9)
EOSINOPHIL # BLD: 0 K/UL (ref 0–0.4)
EOSINOPHILS RELATIVE PERCENT: 0 % (ref 1–4)
ERYTHROCYTE [DISTWIDTH] IN BLOOD BY AUTOMATED COUNT: 18.4 % (ref 12.5–15.4)
GFR SERPL CREATININE-BSD FRML MDRD: >60 ML/MIN/1.73M2
GLUCOSE SERPL-MCNC: 95 MG/DL (ref 70–99)
HCT VFR BLD AUTO: 33.6 % (ref 36–46)
HGB BLD-MCNC: 11.5 G/DL (ref 12–16)
LYMPHOCYTES NFR BLD: 2.9 K/UL (ref 1–4.8)
LYMPHOCYTES RELATIVE PERCENT: 26 % (ref 24–44)
MCH RBC QN AUTO: 32.4 PG (ref 26–34)
MCHC RBC AUTO-ENTMCNC: 34.2 G/DL (ref 31–37)
MCV RBC AUTO: 94.7 FL (ref 80–100)
MICROORGANISM SPEC CULT: NO GROWTH
MONOCYTES NFR BLD: 0.6 K/UL (ref 0.1–1.2)
MONOCYTES NFR BLD: 6 % (ref 2–11)
NEUTROPHILS NFR BLD: 68 % (ref 36–66)
NEUTS SEG NFR BLD: 7.4 K/UL (ref 1.8–7.7)
PLATELET # BLD AUTO: 193 K/UL (ref 140–450)
PMV BLD AUTO: 7.8 FL (ref 6–12)
POTASSIUM SERPL-SCNC: 3.9 MMOL/L (ref 3.7–5.3)
RBC # BLD AUTO: 3.55 M/UL (ref 4–5.2)
SODIUM SERPL-SCNC: 139 MMOL/L (ref 135–144)
SPECIMEN DESCRIPTION: NORMAL
WBC OTHER # BLD: 11 K/UL (ref 3.5–11)

## 2024-03-02 PROCEDURE — 6370000000 HC RX 637 (ALT 250 FOR IP): Performed by: STUDENT IN AN ORGANIZED HEALTH CARE EDUCATION/TRAINING PROGRAM

## 2024-03-02 PROCEDURE — 36415 COLL VENOUS BLD VENIPUNCTURE: CPT

## 2024-03-02 PROCEDURE — 6360000002 HC RX W HCPCS: Performed by: OBSTETRICS & GYNECOLOGY

## 2024-03-02 PROCEDURE — 6360000002 HC RX W HCPCS: Performed by: STUDENT IN AN ORGANIZED HEALTH CARE EDUCATION/TRAINING PROGRAM

## 2024-03-02 PROCEDURE — 80048 BASIC METABOLIC PNL TOTAL CA: CPT

## 2024-03-02 PROCEDURE — 85025 COMPLETE CBC W/AUTO DIFF WBC: CPT

## 2024-03-02 PROCEDURE — 2580000003 HC RX 258: Performed by: STUDENT IN AN ORGANIZED HEALTH CARE EDUCATION/TRAINING PROGRAM

## 2024-03-02 RX ORDER — CLINDAMYCIN PHOSPHATE 300 MG/50ML
300 INJECTION, SOLUTION INTRAVENOUS EVERY 8 HOURS
Status: DISCONTINUED | OUTPATIENT
Start: 2024-03-02 | End: 2024-03-02 | Stop reason: HOSPADM

## 2024-03-02 RX ORDER — KETOROLAC TROMETHAMINE 15 MG/ML
15 INJECTION, SOLUTION INTRAMUSCULAR; INTRAVENOUS EVERY 6 HOURS PRN
Status: DISCONTINUED | OUTPATIENT
Start: 2024-03-02 | End: 2024-03-02 | Stop reason: HOSPADM

## 2024-03-02 RX ADMIN — ESCITALOPRAM OXALATE 5 MG: 10 TABLET ORAL at 09:15

## 2024-03-02 RX ADMIN — LEVOTHYROXINE SODIUM 88 MCG: 88 TABLET ORAL at 05:43

## 2024-03-02 RX ADMIN — ACETAMINOPHEN 1000 MG: 500 TABLET ORAL at 05:43

## 2024-03-02 RX ADMIN — GENTAMICIN SULFATE 94 MG: 40 INJECTION, SOLUTION INTRAMUSCULAR; INTRAVENOUS at 05:48

## 2024-03-02 RX ADMIN — CLINDAMYCIN PHOSPHATE 300 MG: 300 INJECTION, SOLUTION INTRAVENOUS at 09:29

## 2024-03-02 RX ADMIN — PANTOPRAZOLE SODIUM 40 MG: 40 TABLET, DELAYED RELEASE ORAL at 05:43

## 2024-03-02 RX ADMIN — METOPROLOL TARTRATE 25 MG: 25 TABLET, FILM COATED ORAL at 09:15

## 2024-03-02 RX ADMIN — ENOXAPARIN SODIUM 40 MG: 100 INJECTION SUBCUTANEOUS at 09:16

## 2024-03-02 RX ADMIN — KETOROLAC TROMETHAMINE 15 MG: 15 INJECTION, SOLUTION INTRAMUSCULAR; INTRAVENOUS at 00:21

## 2024-03-02 RX ADMIN — FENOFIBRATE 162 MG: 54 TABLET, FILM COATED ORAL at 09:15

## 2024-03-02 RX ADMIN — SODIUM CHLORIDE, POTASSIUM CHLORIDE, SODIUM LACTATE AND CALCIUM CHLORIDE: 600; 310; 30; 20 INJECTION, SOLUTION INTRAVENOUS at 03:14

## 2024-03-02 RX ADMIN — Medication 1 TABLET: at 09:14

## 2024-03-02 ASSESSMENT — PAIN DESCRIPTION - DESCRIPTORS: DESCRIPTORS: ACHING;DISCOMFORT;SPASM

## 2024-03-02 ASSESSMENT — PAIN SCALES - GENERAL
PAINLEVEL_OUTOF10: 0
PAINLEVEL_OUTOF10: 8
PAINLEVEL_OUTOF10: 0
PAINLEVEL_OUTOF10: 0

## 2024-03-02 ASSESSMENT — PAIN DESCRIPTION - ORIENTATION: ORIENTATION: RIGHT

## 2024-03-02 ASSESSMENT — PAIN DESCRIPTION - LOCATION: LOCATION: ABDOMEN

## 2024-03-02 NOTE — PROGRESS NOTES
Pharmacy Note     Renal Dose Adjustment    Maye Jerome is a 83 y.o. female. Pharmacist assessment of renally cleared medications.    Recent Labs     03/01/24 1910   BUN 17       Recent Labs     03/01/24 1910   CREATININE 0.9       Estimated Creatinine Clearance: 48 mL/min (based on SCr of 0.9 mg/dL).      Height:   Ht Readings from Last 1 Encounters:   03/01/24 1.727 m (5' 8\")     Weight:  Wt Readings from Last 1 Encounters:   03/01/24 62.6 kg (138 lb)       The following medication dose has been adjusted based upon renal function per P&T Guidelines:             Fenofibrate adjusted to 54 mg daily for CrCl 31-80 ml/min (CrCl 48 ml/min)    Chandler Vela, PharmD, [facility]  3/1/2024  8:22 PM

## 2024-03-02 NOTE — DISCHARGE SUMMARY
Follow up appointments and discharge instructions reviewed with pt and son at bedside. All question answered to satisfaction. AVS sent home with patient. Shaista, house supervisor, to transport patient off floor.

## 2024-03-02 NOTE — PLAN OF CARE
Problem: Safety - Adult  Goal: Free from fall injury  Outcome: Progressing  Note: Call light within reach, bed alarm on, socks on and bed in lowest position      Problem: Discharge Planning  Goal: Discharge to home or other facility with appropriate resources  Outcome: Progressing  Flowsheets (Taken 3/1/2024 1635 by Nicole Matias RN)  Discharge to home or other facility with appropriate resources: Identify barriers to discharge with patient and caregiver  Note: Patient will begin discharge planning when the patient meets discharge criteria to go home or to a facility     Problem: Pain  Goal: Verbalizes/displays adequate comfort level or baseline comfort level  Outcome: Progressing  Note: Pain is being managed with rest, repositioning and medication

## 2024-03-02 NOTE — OP NOTE
[address] [address]                                OPERATIVE REPORT    PATIENT NAME: CRISTO SULTANA                      :        1940  MED REC NO:   [id number]                             ROOM:  ACCOUNT NO:   [id number]                           ADMIT DATE: 2024  PROVIDER:     Cruzito Genao DO    DATE OF PROCEDURE:  2024    INDICATIONS FOR SURGERY:  Symptomatic recurrent vaginal wall prolapse  with rectocele, severe defecatory dysfunction with rectal prolapse and  stage IV hemorrhoids, pelvic pressure, unamenable to conservative  therapy.    PREOPERATIVE DIAGNOSIS:  Grade 1-2 rectocele with apical posterior wall  prolapse, history of prior robotic sacrocolpopexy in the remote past,  severe defecatory dysfunction with grade 4 hemorrhoids and rectal  prolapse and pelvic pressure.    POSTOPERATIVE DIAGNOSIS  Grade 1-2 rectocele with apical posterior wall  prolapse, history of prior robotic sacrocolpopexy in the remote past,  severe defecatory dysfunction with grade 4 hemorrhoids and rectal  prolapse and pelvic pressure, attenuated and stretched sacrocolpopexy  mesh with redundancy.    PROCEDURES: Cystourethroscopy with bilateral lighted ureteral catheter  placement.  Robotic laparoscopic-assisted revision of prior  sacrocolpopexy apical mesh to accordion and attenuate the mesh to  provide better support.    SURGEON  Cruzito Genao DO.    ASSISTANT:  Tish Woodward DO    ANESTHESIA:  General endotracheal.    FINDINGS  As above.    SPECIMENS:  None.    COMPLICATIONS:  None.    BLOOD LOSS  None.    IMPLANTS:  None.    COURSE PRIOR TO THE PROCEDURE:  Risks and benefits of the procedure were  explained to the patient.  The patient understood and signed informed  consent under no duress or confusion.  She received preoperative  antibiotic and EPC cuffs were functional.    DESCRIPTION OF PROCEDURE:  The patient was

## 2024-03-02 NOTE — PROGRESS NOTES
Patient transported to entrance B for discharge.  Son and writer put patient in the front passenger seat. No issues noted.

## 2024-03-02 NOTE — PROGRESS NOTES
Patient seen postoperative day #1 after robotic laparoscopic redo sacrocolpopexy by Dr. Genao and rectal prolapse rectopexy surgery per myself.  Discussed with patient we were able to bring her rectum up and attached both to the mesh that Dr. Genao was using as well as the bone at the sacral inlet.    She also had removal of about 3 separate thrombosed hemorrhoids and some of the excess skin at the rectum although not all the Lexiscan.  She is having minimal pain but I did discuss with her that we had injected Exparel both at the trocar sites as well as circumferentially around the rectum asked expect a little bit more pain around the rectum in a couple of days once this wears off.    On examination awake alert and oriented  Good lung excursion  All trocar incision sites clean and dry  Abdomen a little bit bloated and distended.  Patient having quite a bit of rumbles with bowel sounds and feels as if she is going to pass gas shortly but has not done so yet.    Impression/recommendation  Patient tolerating diet so far without any nausea or vomiting  Good pain control  Discussed with patient to expect considerable amount of pain at the rectum and also some mucus and bleeding with bowel movements.  Would have her take quite a bit of stool softener with Colace or senna and MiraLAX and fiber supplements, she has the latter 2 at home already.  She needs to get to the point that she is not quite having diarrhea.  She should expect some bloody mucus from her rectum with every bowel movement for at least a couple of not several weeks.    Will have the patient see one of my partners in the office in about a week or so, I am going to be on vacation, and then I will see her myself a couple of weeks after that.

## 2024-03-02 NOTE — PROGRESS NOTES
Dr. Genao was called via phone due to patient having abdominal distension.     0010- Dr. Genao called back and ordered Toradol for the patient's pain. Doctor stated \"call Dr. Cano if the patient becomes worse d/t Dr. Cano doing most of the surgery.\"

## 2024-03-06 LAB — SURGICAL PATHOLOGY REPORT: NORMAL

## 2024-03-31 PROBLEM — Z98.890 POST-OPERATIVE STATE: Status: RESOLVED | Noted: 2024-03-01 | Resolved: 2024-03-31

## 2024-11-04 NOTE — PROGRESS NOTES
[facility] UROGYNECOLOGY AND PELVIC REHABILITATION   [address] [place] [address]  Dept: [phone]  Date: 11/5/2024  Patient Name: Maye Jerome    VISIT - FOLLOW UP VISIT     CC: had concerns including Post-Op Check (6 month post op. Patient states she is having some urgencies ).    HPI: Patient is here for a 6 month follow up. She had a robotic assisted sacral colpopexy with placement of bilateral lighted ureteral stents. Dr. Coco Cano performed a robotic rectopexy with hemorrhoidectomy ligasure on 3/2024. She has been to PFT. She no longer has rectal bleeding since this procedure.She still notes that she still has to strain to have a bowel movement. She has noted that in September she may have difficulty emptying her bladder. She did go to her PCP to check for a UTI a couple weeks ago for UTI symptoms - she reports that her urine was clear at this time. She has noticed some increased urinary urgency with urge related urinary leakage. She has followed back up with Dr. Cano; she had noted an internal hemorrhoid again.     Past Medical History:   Diagnosis Date    A-fib ([facility])     Age-related facial wrinkles     Anxiety state 10/26/2005    Cancer ([facility])     basal cell    Constipation due to outlet dysfunction 10/26/2005    Cystocele, unspecified     Dental crowns present     Disorder of mitral valve 10/26/2005    Female genital symptoms 10/26/2005    Gastroesophageal reflux disease 10/26/2005    Heart disease     Hemifacial spasm     right side    Hemorrhoid     Hemorrhoids     Hiatal hernia     \"healed,\" now    Hypothyroidism 10/26/2005    Irritable bowel syndrome     Migraine headache 10/26/2005    Muscle ache 10/26/2005    Osteoarthritis     back and neck    Proctocele 10/26/2005    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

## 2024-11-05 ENCOUNTER — OFFICE VISIT (OUTPATIENT)
Age: 84
End: 2024-11-05
Payer: MEDICARE

## 2024-11-05 VITALS
TEMPERATURE: 97.2 F | DIASTOLIC BLOOD PRESSURE: 81 MMHG | HEART RATE: 70 BPM | OXYGEN SATURATION: 97 % | SYSTOLIC BLOOD PRESSURE: 134 MMHG

## 2024-11-05 DIAGNOSIS — R33.9 RETENTION OF URINE: ICD-10-CM

## 2024-11-05 DIAGNOSIS — R39.15 URINARY URGENCY: Primary | ICD-10-CM

## 2024-11-05 DIAGNOSIS — N81.10 BADEN-WALKER GRADE 1 CYSTOCELE: ICD-10-CM

## 2024-11-05 DIAGNOSIS — R35.0 URINARY FREQUENCY: ICD-10-CM

## 2024-11-05 LAB
BILIRUBIN, POC: NORMAL
BLOOD URINE, POC: 50
CLARITY, POC: NORMAL
COLOR, POC: NORMAL
GLUCOSE URINE, POC: NORMAL MG/DL
KETONES, POC: NORMAL MG/DL
LEUKOCYTE EST, POC: NORMAL
NITRITE, POC: NORMAL
PH, POC: 7
PROTEIN, POC: NORMAL MG/DL
SPECIFIC GRAVITY, POC: 1.01
UROBILINOGEN, POC: NORMAL MG/DL

## 2024-11-05 PROCEDURE — 81003 URINALYSIS AUTO W/O SCOPE: CPT

## 2024-11-05 PROCEDURE — 99213 OFFICE O/P EST LOW 20 MIN: CPT

## 2024-11-05 PROCEDURE — 51798 US URINE CAPACITY MEASURE: CPT

## 2024-11-05 NOTE — PATIENT INSTRUCTIONS
[facility]    URGE SUPPRESSION EXERCISES    ? Do you rush to the bathroom for fear of losing urine?    ? Do you dribble urine on your way to the bathroom?        The feeling of urgency to get to the bathroom most likely is caused by bladder spasms.  Rushing to the bathroom during spasms or urge causes your bladder to bounce.  This causes more bladder spasms.  Learning urge suppression may help you control and / or eliminate these spasms so that you can stop the feeling of urgency and walk to the bathroom calmly.    To stop the feeling of urgency to urinate:    1. Remain in the same position in which you began having the feeling to urinate.  If You are sitting, remain   seated.  If you are walking, stop walking but remain standing.    2. Tighten your rectum, then let go a little, tighten again and let go a little.  Keep doing this until the urge   feeling has passed (about 15 - 30 seconds).By tightening and letting go of your rectum, you stimulate a   nerve in your Pelvic muscles which causes the bladder to relax.  This stops the strong feeling to urinate.    3. Tell yourself that you are in control of your bladder - not the other way around.    4. Once the urge is over, take a deep breath and relax.  Then walk to the bathroom calmly.              [facility] UROGYNECOLOGY & PELVIC REHABILITATION    Strengthening Exercises for Pelvic Floor Muscles      Introduction  Pregnancy, childbirth, obesity and excessive straining from frequent constipation can weaken a woman's pelvic floor muscles.    Weak pelvic floor muscles can cause urinary incontinence or loss of urine when pressure is exerted on the bladder (for example, by coughing, laughing, running, jumping or lifting).   This is called stress incontinence.  Aging and decreased levels of estrogen during and following menopause also can contribute to urinary incontinence.    Many women with urinary incontinence can

## (undated) DEVICE — PLUMEPORT ACTIV LAPAROSCOPIC SMOKE FILTRATION DEVICE: Brand: PLUMEPORT ACTIVE

## (undated) DEVICE — YANKAUER,SMOOTH HANDLE,HIGH CAPACITY: Brand: MEDLINE INDUSTRIES, INC.

## (undated) DEVICE — DEVICE TRCR 12X9X3IN WHT CLSR DISP OMNICLOSE

## (undated) DEVICE — PAD,SANITARY,11 IN,MAXI,W/WINGS,N-STRL: Brand: MEDLINE

## (undated) DEVICE — SOLUTION PREP PAINT POV IOD FOR SKIN MUCOUS MEM

## (undated) DEVICE — Device

## (undated) DEVICE — COUNTER NDL 10 COUNT HLD 20 FOAM BLK SGL MAG

## (undated) DEVICE — BLADELESS OBTURATOR: Brand: WECK VISTA

## (undated) DEVICE — APPLICATOR MEDICATED 26 CC SOLUTION HI LT ORNG CHLORAPREP

## (undated) DEVICE — GLOVE ORANGE PI 7 1/2   MSG9075

## (undated) DEVICE — TROCAR: Brand: KII FIOS FIRST ENTRY

## (undated) DEVICE — ARM DRAPE

## (undated) DEVICE — TROCARS: Brand: KII® BALLOON BLUNT TIP SYSTEM

## (undated) DEVICE — PAD PT POS 36 IN SURGYPAD DISP

## (undated) DEVICE — TIP COVER ACCESSORY

## (undated) DEVICE — CATHETER KIT 6 FRX70X370 CM FIBEROPTIC EMITTING IRIS

## (undated) DEVICE — SUTURE VCRL + SZ 2-0 L27IN ABSRB WHT SH 1/2 CIR TAPERCUT VCP417H

## (undated) DEVICE — SOLUTION IRRIG 1000ML 0.9% SOD CHL USP POUR PLAS BTL

## (undated) DEVICE — SCISSOR SURG METZ CRV TIP

## (undated) DEVICE — MASTISOL ADHESIVE LIQ 2/3ML

## (undated) DEVICE — LIQUIBAND RAPID ADHESIVE 36/CS 0.8ML: Brand: MEDLINE

## (undated) DEVICE — CATHETER URETH 18FR BLLN 30CC 2 W F INF CTRL BARDX

## (undated) DEVICE — BLADE CLIPPER SURG SENSICLIP

## (undated) DEVICE — GOWN,SIRUS,NONRNF,SETINSLV,XL,20/CS: Brand: MEDLINE

## (undated) DEVICE — SUTURE SZ 0 27IN 5/8 CIR UR-6  TAPER PT VIOLET ABSRB VICRYL J603H

## (undated) DEVICE — STRAP,POSITIONING,KNEE/BODY,FOAM,4X60": Brand: MEDLINE

## (undated) DEVICE — APPLIER SUT CLP FOR 2-0 3-0 4-0 VCRL ABSRB SUT

## (undated) DEVICE — SOLUTION IV 1000ML 0.9% SOD CHL PH 5 INJ USP VIAFLX PLAS

## (undated) DEVICE — GLOVE SURG SZ 65 THK91MIL LTX FREE SYN POLYISOPRENE

## (undated) DEVICE — TUBING, SUCTION, 9/32" X 20', STRAIGHT: Brand: MEDLINE INDUSTRIES, INC.

## (undated) DEVICE — ELECTRODE PT RET AD L9FT HI MOIST COND ADH HYDRGEL CORDED

## (undated) DEVICE — SOLUTION SCRB 4OZ 7.5% POVIDONE IOD ANTIMIC BTL

## (undated) DEVICE — BLANKET WRM W29.9XL79.1IN UP BODY FORC AIR MISTRAL-AIR

## (undated) DEVICE — SEALER LAP SM L18.8CM OPN JAW HAND/FOOT SWCH FORCETRIAD

## (undated) DEVICE — SOLUTION ANTIFOG VIS SYS CLEARIFY LAPSCP

## (undated) DEVICE — INSUFFLATION NEEDLE TO ESTABLISH PNEUMOPERITONEUM.: Brand: INSUFFLATION NEEDLE

## (undated) DEVICE — MHPB BASIC LAP PACK: Brand: MEDLINE INDUSTRIES, INC.

## (undated) DEVICE — SEAL

## (undated) DEVICE — UNDERPANTS MAT L XL SEAMLESS CLR CODE WAISTBAND KNIT

## (undated) DEVICE — SUTURE MCRYL + SZ 4-0 L27IN ABSRB UD L19MM PS-2 3/8 CIR MCP426H

## (undated) DEVICE — MHPB GYN MINOR PACK: Brand: MEDLINE INDUSTRIES, INC.

## (undated) DEVICE — TOTAL TRAY, 16FR 10ML SIL FOLEY, URN: Brand: MEDLINE

## (undated) DEVICE — Y-TYPE TUR/BLADDER IRRIGATION SET, REGULATING CLAMP

## (undated) DEVICE — SUTURE PROL SZ 0 L30IN NONABSORBABLE BLU L26MM CT-2 1/2 CIR 8412H